# Patient Record
Sex: MALE
[De-identification: names, ages, dates, MRNs, and addresses within clinical notes are randomized per-mention and may not be internally consistent; named-entity substitution may affect disease eponyms.]

---

## 2017-09-12 NOTE — EDM.PDOC
ED HPI GENERAL MEDICAL PROBLEM





- General


Chief Complaint: Laceration


Stated Complaint: CUT ON RIGHT HAND


Time Seen by Provider: 09/12/17 20:00


Source of Information: Reports: Patient


History Limitations: Reports: No Limitations





- History of Present Illness


INITIAL COMMENTS - FREE TEXT/NARRATIVE: 


HISTORY AND PHYSICAL:





History of present illness:


Patient is a 62-year-old male who presents to the emergency room with a 

laceration to the right hand. Patient was in his carotids working with a 

circular saw and received a 4 cm laceration to the web between the first and 

second digit. Patient has full range of motion without any difficulty


Patient is unsure of his last tetanus shot, we will provide that today.





Review of systems: 


As per history of present illness and below otherwise all systems reviewed and 

negative.





Past medical history: 


As per history of present illness and as reviewed below otherwise 

noncontributory.





Surgical history: 


As per history of present illness and as reviewed below otherwise 

noncontributory.





Social history: 


No reported history of drug or alcohol abuse.





Family history: 


As per history of present illness and as reviewed below otherwise 

noncontributory.





Physical exam:


HEENT: Atraumatic, normocephalic, pupils reactive, negative for conjunctival 

pallor or scleral icterus, mucous membranes moist, throat clear, neck supple, 

nontender, trachea midline.


Lungs: Clear to auscultation, breath sounds equal bilaterally, chest nontender.


Heart: S1S2, regular, negative for clicks, rubs, or JVD.


Abdomen: Soft, nondistended, nontender. Negative for masses or 

hepatosplenomegaly. Negative for costovertebral tenderness.


Pelvis: Stable nontender.


Genitourinary: Deferred.


Rectal: Deferred.


Skin: 4 cm laceration to the web between the first and second digit on the 

right hand that reaches half way across the proximal knuckle. No tendon 

involvement. No current bleeding noted.


Extremities: Atraumatic, negative for cords or calf pain. Neurovascular 

unremarkable.


Neuro: Awake, alert, oriented. Cranial nerves II through XII unremarkable. 

Cerebellum unremarkable. Motor and sensory unremarkable throughout. Exam 

nonfocal.





Diagnostics:


X-ray of right hand





Therapeutics:


Lidocaine, bacitracin, nonstick dressing, Aluminum spoon splint


Laceration was explored to the base in a bloodless field him a no tendon 

involvement. 6 interrupted sutures were completed using 4-0 nylon.





Impression: 


Laceration





Definitive disposition and diagnosis as appropriate pending reevaluation and 

review of above.


Onset: Today


Duration: Minutes:





- Related Data


 Allergies











Allergy/AdvReac Type Severity Reaction Status Date / Time


 


No Known Allergies Allergy   Verified 09/12/17 20:47











Home Meds: 


 Home Meds





Levothyroxine Sodium [Synthroid] 200 mcg PO ACBRK 04/23/14 [History]


Lisinopril [Zestril] 10 mg PO DAILY 04/23/14 [History]


Enoxaparin [Lovenox] 140 mg SUBCUT 0600,1800 #8 syringe 04/25/15 [Rx]


Warfarin [Coumadin] 7.5 mg PO DAILY@1400 #6 tablet 04/25/15 [Rx]











Past Medical History





- Past Health History


Medical/Surgical History: Denies Medical/Surgical History





Social & Family History





- Tobacco Use


Smoking Status *Q: Former Smoker


Years of Tobacco use: 10


Used Tobacco, but Quit: Yes


Month Tobacco Last Used: 30 years ago


Second Hand Smoke Exposure: Yes





- Alcohol Use


Days Per Week of Alcohol Use: 1


Number of Drinks Per Day: 1


Total Drinks Per Week: 1





- Recreational Drug Use


Recreational Drug Use: No





ED ROS GENERAL





- Review of Systems


Review Of Systems: ROS reveals no pertinent complaints other than HPI.





ED EXAM, SKIN/RASH


Exam: See Below (See dictation)





ED SKIN PROCEDURES





- Laceration/Wound Repair


  ** Right Hand


Appearance: Subcutaneous


Anesthetic Type: Local


Local Anesthesia - Lidocaine (Xylocaine): 1% Plain


Local Anesthetic Volume: Other (8)


Skin Prep: Chlorhexidine (Hibiciens), Saline, Sterile Drape


Exploration/Debridement/Repair: Wound Explored, In a Bloodless Field, No 

Foreign Material Found


Closed with: Sutures


Suture Size: 4-0


# of Sutures: 6


Suture Type: Nylon





Course





- Vital Signs


Last Recorded V/S: 


 Last Vital Signs











Temp  36.4 C   09/12/17 20:00


 


Pulse  75   09/12/17 20:00


 


Resp  16   09/12/17 20:00


 


BP  130/71   09/12/17 20:00


 


Pulse Ox  99   09/12/17 20:00














- Orders/Labs/Meds


Orders: 


 Active Orders 24 hr











 Category Date Time Status


 


 Communication Order [RC] STAT Care  09/12/17 19:59 Active


 


 Communication Order [RC] STAT Care  09/12/17 20:45 Ordered


 


 Vaccines to be Administered [RC] PER UNIT ROUTINE Care  09/12/17 19:59 Active


 


 Hand 2V Rt [CR] Stat Exams  09/12/17 20:02 Taken











Meds: 


Medications














Discontinued Medications














Generic Name Dose Route Start Last Admin





  Trade Name Mikhail  PRN Reason Stop Dose Admin


 


Bacitracin  1 dose  09/12/17 19:59  09/12/17 20:08





  Bacitracin Oint 1 Gm  TOP  09/12/17 20:00  1 dose





  ONETIME ONE   Administration


 


Diphtheria/Tetanus/Acell Pertussis  0.5 ml  09/12/17 19:58  09/12/17 20:08





  Adacel  IM  09/12/17 19:59  0.5 ml





  .ONCE ONE   Administration


 


Lidocaine HCl  20 ml  09/12/17 19:59  09/12/17 20:07





  Xylocaine 1%  INJECT  09/12/17 20:00  20 ml





  ONETIME ONE   Administration














Departure





- Departure


Time of Disposition: 20:49


Disposition: Home, Self-Care 01


Condition: Good


Clinical Impression: 


 Laceration








- Discharge Information


Referrals: 


Raymond Garduno MD [Primary Care Provider] - 


Forms:  ED Department Discharge


Additional Instructions: 


The following information is given to patients seen in the emergency department 

who are being discharged to home. This information is to outline your options 

for follow-up care. We provide all patients seen in our emergency department 

with a follow-up referral.





The need for follow-up, as well as the timing and circumstances, are variable 

depending upon the specifics of your emergency department visit.





If you don't have a primary care physician on staff, we will provide you with a 

referral. We always advise you to contact your personal physician following an 

emergency department visit to inform them of the circumstance of the visit and 

for follow-up with them and/or the need for any referrals to a consulting 

specialist.





The emergency department will also refer you to a specialist when appropriate. 

This referral assures that you have the opportunity for followup care with a 

specialist. All of these measure are taken in an effort to provide you with 

optimal care, which includes your followup.





Under all circumstances we always encourage you to contact your private 

physician who remains a resource for coordinating  your care. When calling for 

followup care, please make the office aware that this follow-up is from your 

recent emergency room visit. If for any reason you are refused follow-up, 

please contact the [facility] emergency department at [phone] 

and asked to speak to the emergency department charge nurse.





[facility]


Primary Care


[address] [place] [id number]


Phone: [phone]


Fax: [phone]





1. Please keep the wound clean and dry as we discussed. The aluminum splint we 

have provided you will prevent the sutures from popping open as we discussed. 

Monitor for signs of infection as we discussed.


2. Sutures are to be removed in 7-10 days. Follow up with her primary care in 

the next 1-2 days. Follow-up in the ED as needed discussed





- My Orders


Last 24 Hours: 


My Active Orders





09/12/17 19:59


Communication Order [RC] STAT 


Vaccines to be Administered [RC] PER UNIT ROUTINE 





09/12/17 20:02


Hand 2V Rt [CR] Stat 





09/12/17 20:45


Communication Order [RC] STAT 














- Assessment/Plan


Last 24 Hours: 


My Active Orders





09/12/17 19:59


Communication Order [RC] STAT 


Vaccines to be Administered [RC] PER UNIT ROUTINE 





09/12/17 20:02


Hand 2V Rt [CR] Stat 





09/12/17 20:45


Communication Order [RC] STAT

## 2017-09-13 NOTE — CR
EXAM DATE: 17



PATIENT'S AGE: 62





Patient: DELFINO TREADWELL



Facility: [place], [place]

Patient ID: [id number]

Site Patient ID: [id number].

Site Accession #: [id number]

: 1955

Study: XRay Extremity Right hand [id number]/12/2017 8:24:35 PM

Ordering Physician: Doctor Temp



Final Report: 

INDICATION:

Pain, laceration 



TECHNIQUE:

Three views right hand 



COMPARISON:

None



FINDINGS:

Bones: Alignment is normal. No fractures or bone lesions. 

Joint spaces: Mild degenerative changes PIP and DIP joint spaces. 

Soft tissues: Unremarkable. 



IMPRESSION:

No evidence of acute trauma. 

Mild degenerative changes PIP and DIP joint spaces



Dictated by Raymond Grady MD @ 2017 8:47:27 PM





Dictated by: Raymond Grady MD @ 2017 20:47:33

(Electronic Signature)



Report Signed by Proxy.
[facility]

## 2021-04-13 ENCOUNTER — HOSPITAL ENCOUNTER (OUTPATIENT)
Dept: HOSPITAL 56 - MW.SDS | Age: 66
Discharge: HOME | End: 2021-04-13
Attending: SURGERY
Payer: COMMERCIAL

## 2021-04-13 VITALS — DIASTOLIC BLOOD PRESSURE: 75 MMHG | HEART RATE: 86 BPM | SYSTOLIC BLOOD PRESSURE: 131 MMHG

## 2021-04-13 DIAGNOSIS — F17.210: ICD-10-CM

## 2021-04-13 DIAGNOSIS — I10: ICD-10-CM

## 2021-04-13 DIAGNOSIS — K64.8: ICD-10-CM

## 2021-04-13 DIAGNOSIS — E03.9: ICD-10-CM

## 2021-04-13 DIAGNOSIS — E55.9: ICD-10-CM

## 2021-04-13 DIAGNOSIS — K57.30: ICD-10-CM

## 2021-04-13 DIAGNOSIS — Z79.899: ICD-10-CM

## 2021-04-13 DIAGNOSIS — Z79.890: ICD-10-CM

## 2021-04-13 DIAGNOSIS — Z98.890: ICD-10-CM

## 2021-04-13 DIAGNOSIS — E66.9: ICD-10-CM

## 2021-04-13 DIAGNOSIS — K64.4: ICD-10-CM

## 2021-04-13 DIAGNOSIS — D12.3: Primary | ICD-10-CM

## 2021-04-13 PROCEDURE — 88304 TISSUE EXAM BY PATHOLOGIST: CPT

## 2021-04-13 PROCEDURE — 46999 UNLISTED PROCEDURE ANUS: CPT

## 2021-04-13 PROCEDURE — 88305 TISSUE EXAM BY PATHOLOGIST: CPT

## 2021-04-13 PROCEDURE — 45380 COLONOSCOPY AND BIOPSY: CPT

## 2021-04-13 NOTE — PCM.POSTAN
POST ANESTHESIA ASSESSMENT





- MENTAL STATUS


Mental Status: Alert (no anesthetic problems), Oriented





- VITAL SIGNS


Vital Signs: 


                                Last Vital Signs











Temp  97.2 F   04/13/21 12:37


 


Pulse  100   04/13/21 12:56


 


Resp  15   04/13/21 12:56


 


BP  100/51 L  04/13/21 12:56


 


Pulse Ox  93 L  04/13/21 12:56














- RESPIRATORY


Respiratory Status: Respiratory Rate WNL, Airway Patent, O2 Saturation Stable





- CARDIOVASCULAR


CV Status: Pulse Rate WNL, Blood Pressure Stable





- GASTROINTESTINAL


GI Status: No Symptoms





- POST OP HYDRATION


Hydration Status: Adequate & Stable

## 2021-04-13 NOTE — PCM48HPAN
Post Anesthesia Note





- EVALUATION WITHIN 48HRS OF ANESTHETIC


Vital Signs in Normal Range: Yes


Patient Participated in Evaluation: Yes


Respiratory Function Stable: Yes


Airway Patent: Yes


Cardiovascular Function Stable: Yes


Hydration Status Stable: Yes


Pain Control Satisfactory: Yes


Nausea and Vomiting Control Satisfactory: Yes


Mental Status Recovered: Yes


Vital Signs: 


                                Last Vital Signs











Temp  97.2 F   04/13/21 12:37


 


Pulse  100   04/13/21 12:56


 


Resp  15   04/13/21 12:56


 


BP  100/51 L  04/13/21 12:56


 


Pulse Ox  93 L  04/13/21 12:56

## 2021-04-13 NOTE — PCM.PREANE
Preanesthetic Assessment





- Anesthesia/Transfusion/Family Hx


Anesthesia History: Prior Anesthesia Without Reaction


Family History of Anesthesia Reaction: No


Transfusion History: No Prior Transfusion(s)





- Review of Systems


General: No Symptoms


Pulmonary: No Symptoms


Cardiovascular: No Symptoms


Gastrointestinal: No Symptoms


Neurological: No Symptoms


Other: Reports: None





- Physical Assessment


NPO Status Date: 04/13/21


NPO Status Time: 00:05


Vital Signs: 





                                Last Vital Signs











Temp  97.3 F   04/13/21 10:17


 


Pulse  90   04/13/21 10:17


 


Resp  16   04/13/21 10:17


 


BP  140/81   04/13/21 10:17


 


Pulse Ox  94 L  04/13/21 10:17











Height: 6 ft


Weight: 287 lb


ASA Class: 2


Mental Status: Alert & Oriented x3


Airway Class: Mallampati = 3


Dentition: Reports: Normal Dentition, Partial


ROM/Head Extension: Limited/Partial


Lungs: Clear to Auscultation, Normal Respiratory Effort


Cardiovascular: Regular Rate, Regular Rhythm





- Allergies


Allergies/Adverse Reactions: 


                                    Allergies











Allergy/AdvReac Type Severity Reaction Status Date / Time


 


No Known Allergies Allergy   Verified 04/13/21 10:14














- Anesthesia Plan


Pre-Op Medication Ordered: None





- Acknowledgements


Anesthesia Type Planned: General Anesthesia


Pt an Appropriate Candidate for the Planned Anesthesia: Yes


Alternatives and Risks of Anesthesia Discussed w Pt/Guardian: Yes


Pt/Guardian Understands and Agrees with Anesthesia Plan: Yes


Additional Comments: 





npo aftr mn


asthma prn inhalers


htn no cv problems


tob  quit 1990


chew tob now


etoh daily 1-2 beers, occ whisky


obesity   bmi 37


had pulmonary embolus abt 7 years ago treated with coumadin.  He stopped this 

med several years ago


mal 3 aw, partial lower denture


par no questions








PreAnesthesia Questionnaire





- Past Health History


Medical/Surgical History: Denies Medical/Surgical History


HEENT History: Reports: Other (See Below)


Other HEENT History: wears glasses, bottom partial


Cardiovascular History: Reports: Hypertension


Respiratory History: Reports: Asthma, PE


Other Respiratory History: PT in the past


Gastrointestinal History: Reports: Hemorrhoids


Genitourinary History: Reports: None


Musculoskeletal History: Reports: None


Neurological History: Reports: None


Psychiatric History: Reports: None


Endocrine/Metabolic History: Reports: Hypothyroidism, Obesity/BMI 30+


Hematologic History: Reports: None


Immunologic History: Reports: None


Oncologic (Cancer) History: Reports: None


Dermatologic History: Reports: Eczema





- Infectious Disease History


Infectious Disease History: Reports: Chicken Pox, Measles





- Past Surgical History


Head Surgeries/Procedures: Reports: None


HEENT Surgical History: Reports: None


Cardiovascular Surgical History: Reports: None


Respiratory Surgical History: Reports: None


GI Surgical History: Reports: None


Male  Surgical History: Reports: None


Endocrine Surgical History: Reports: None


Neurological Surgical History: Reports: None


Musculoskeletal Surgical History: Reports: None


Oncologic Surgical History: Reports: None


Dermatological Surgical History: Reports: None





- SUBSTANCE USE


Tobacco Use Status *Q: Former Tobacco User


Tobacco Use Within Last Twelve Months: Other (See Below)


Days Per Week of Alcohol Use: 7


Number of Drinks Per Day: 2


Total Drinks Per Week: 14


Recreational Drug Use History: No





- HOME MEDS


Home Medications: 


                                    Home Meds





Levothyroxine Sodium [Synthroid] 200 mcg PO ACBRK 04/23/14 [History]


Albuterol Sulfate [Albuterol Sulfate HFA] 2 puff INH ASDIRECTED PRN 04/07/21 

[History]


Ergocalciferol (Vitamin D2) [Vitamin D2] 50,000 units PO ASDIRECTED 04/07/21 

[History]


Hydrocortisone [Hydrocortisone 2.5% Crm] 1 applic TOP ASDIRECTED PRN 04/07/21 

[History]


Lisinopril/Hydrochlorothiazide [Lisinopril-HCTZ 10-12.5 MG] 1 tab PO DAILY 

04/07/21 [History]











- CURRENT (IN HOUSE) MEDS


Current Meds: 





                               Current Medications





Lactated Ringer's (Ringers, Lactated)  1,000 mls @ 125 mls/hr IV ASDIRECTED [facility]


   Last Admin: 04/13/21 10:13 Dose:  125 mls/hr


   Documented by: 


Sodium Chloride (Sodium Chloride 0.9% 10 Ml Syringe)  10 ml FLUSH ASDIRECTED PRN


   PRN Reason: Keep Vein Open


Sodium Chloride (Sodium Chloride 0.9% 2.5 Ml Syringe)  2.5 ml FLUSH ASDIRECTED 

PRN


   PRN Reason: Keep Vein Open


Sodium Chloride (Sodium Chloride 0.9% 10 Ml Syringe)  10 ml FLUSH ASDIRECTED PRN


   PRN Reason: Keep Vein Open


Sodium Chloride (Sodium Chloride 0.9% 2.5 Ml Syringe)  2.5 ml FLUSH ASDIRECTED 

PRN


   PRN Reason: Keep Vein Open


Sodium Chloride (Sodium Chloride 0.9% 10 Ml Sdv)  10 ml IV ASDIRECTED PRN


   PRN Reason: IV Use





Discontinued Medications





Cefoxitin Sodium 2 gm/ Premix  50 mls @ 100 mls/hr IV ONETIME ONE


   Stop: 04/12/21 09:00

## 2021-04-13 NOTE — PCM.OPNOTE
- General Post-Op/Procedure Note


Date of Surgery/Procedure: 04/13/21


Operative Procedure(s): Screening colonoscopy with polypectomy, single column 

hemorrhoidectomy


Findings: 





Large pedunculated right posterior hemorrhoid, transverse colon polyp, 

diverticulosis throughout the colon 


Pre Op Diagnosis: Hemorrhoid, Screening colonoscopy


Post-Op Diagnosis: Hemorrhoid, transverse colon polyp, diverticulosis


Anesthesia Technique: MAC


Primary Surgeon: Radha Celaya


Fluid Replacement, Intraop: 700


EBL in mLs: 5


Condition: Good

## 2021-04-14 NOTE — OR
SURGEON:

RADHA CELAYA MD

 

DATE OF PROCEDURE:  04/13/2021

 

PREOPERATIVE DIAGNOSES:

1. Screening colonoscopy.

2. Hemorrhoidectomy.

 

PROCEDURE PERFORMED:

1. Screening colonoscopy with polypectomy.

2. Hemorrhoidectomy.

 

PRIMARY SURGEON:

Radha Celaya MD

 

ANESTHESIA:

MAC, local.

 

FLUIDS:

See Anesthesia record.

 

ESTIMATED BLOOD LOSS:

3 mL.

 

FINDINGS:

Transverse colon polyp, diverticulosis throughout the colon, and a large right

posterior external hemorrhoid.

 

PREPARATION:

Good.

 

LIMITATIONS:

None.

 

COMPLICATIONS:

None.

 

INDICATIONS:

The patient is a 66-year-old male who came to see me for a screening

colonoscopy.  He also was complaining of a large external hemorrhoid, which is

causing him intermittent discomfort and making it difficult to keep himself

clean.  Physical exam revealed a large pedunculated external hemorrhoid that

would be amenable to resection.  I explained the colonoscopy procedure as well

as the hemorrhoidectomy procedure.  We went through the expected perioperative

course and the risks.  He verbalized understanding and wishes to proceed.

 

PROCEDURE IN DETAIL:

The patient was brought into the endoscopy suite and placed in the left lateral

decubitus position.  A time-out was completed verifying the patient's name, age,

date of birth, allergies, and procedure to be performed.  Monitored anesthesia

care was induced and continuous oxygen was provided via nasal cannula throughout

the procedure.  After adequate sedation was achieved, a digital rectal exam was

performed.  Again, I noted a right posterior external hemorrhoid, which was

quite large.  The remainder of the digital rectal exam was normal.  A well-

lubricated colonoscope was then inserted into the rectum and advanced under

direct visualization to the level of the cecum.  The patient had a very

redundant colon making it difficult for me to get to the cecum.  I was able to

look within the cecal cap but not intubate it.  After multiple position changes

and other attempts, a photograph of this was taken, and I made no further

attempts to physically enter the cecum or retroflex the scope within that area.

The scope was fully withdrawn, and I closely inspected the mucosa throughout the

colon.  The patient had diverticulosis even within the ascending colon.  In the

mid transverse colon, the patient was noted to have a small sessile polyp.  This

was removed in piecemeal fashion using cold biopsy forceps.  The remainder of

the exam was normal, and the scope was retroflexed within the rectum to allow

visualization of the anal canal opening.  The internal hemorrhoids appeared

normal.  A photograph was taken.  The scope was straightened out and fully

withdrawn.  The cecum to anus time was 16 minutes.  This portion the procedure

was ended.  I then began preparation for the external hemorrhoidectomy.  The

buttocks and anoderm were prepped and draped in usual standard fashion.  I

anesthetized the skin around and underneath the external hemorrhoid with 0.5%

Marcaine plain.  A Catia clamp was used to grasp the hemorrhoid.  Using needle-

tip cautery, I then excised the hemorrhoid in a noni-shaped fashion over its

base.  The external hemorrhoid was excised and sent to Pathology.  The mucosal

and anoderm defect were then closed using a 2-0 chromic suture.  On the mucosa,

I used a running locking stitch.  On the anoderm, I used a simple stitch.  I

then inspected my operative field for hemostasis.  The area appeared hemostatic.

I then circumferentially anesthetized the anoderm with the remaining 0.5%

Marcaine plain.  Fluffs and mesh underwear were then applied.  The patient was

placed in supine position and awoken.  He was taken to PACU in stable condition.

All counts were complete and correct at the end of the case.

 

ENDOSCOPIC DIAGNOSES:

Transverse colon polyp, diverticulosis throughout the colon, and a large right

posterior external hemorrhoid.

 

RECOMMENDATIONS:

Follow up in clinic in 2 weeks.

 

 

JACE MC

DD:  04/14/2021 13:22:40

DT:  04/14/2021 17:19:07

Job #:  [id number]

[email or web address]

## 2021-10-20 ENCOUNTER — HOSPITAL ENCOUNTER (EMERGENCY)
Dept: HOSPITAL 56 - MW.ED | Age: 66
Discharge: HOME | End: 2021-10-20
Payer: COMMERCIAL

## 2021-10-20 VITALS — SYSTOLIC BLOOD PRESSURE: 132 MMHG | DIASTOLIC BLOOD PRESSURE: 90 MMHG | HEART RATE: 80 BPM

## 2021-10-20 DIAGNOSIS — I48.92: Primary | ICD-10-CM

## 2021-10-20 DIAGNOSIS — J45.909: ICD-10-CM

## 2021-10-20 DIAGNOSIS — I10: ICD-10-CM

## 2021-10-20 DIAGNOSIS — E03.9: ICD-10-CM

## 2021-10-20 DIAGNOSIS — E66.9: ICD-10-CM

## 2021-10-20 DIAGNOSIS — Z79.899: ICD-10-CM

## 2021-10-20 LAB
BUN SERPL-MCNC: 15 MG/DL (ref 7–18)
CHLORIDE SERPL-SCNC: 99 MMOL/L (ref 98–107)
CO2 SERPL-SCNC: 29.8 MMOL/L (ref 21–32)
GLUCOSE SERPL-MCNC: 118 MG/DL (ref 74–106)
POTASSIUM SERPL-SCNC: 5.1 MMOL/L (ref 3.5–5.1)
SODIUM SERPL-SCNC: 138 MMOL/L (ref 136–148)

## 2021-10-20 PROCEDURE — 85379 FIBRIN DEGRADATION QUANT: CPT

## 2021-10-20 PROCEDURE — 80053 COMPREHEN METABOLIC PANEL: CPT

## 2021-10-20 PROCEDURE — 71275 CT ANGIOGRAPHY CHEST: CPT

## 2021-10-20 PROCEDURE — 36415 COLL VENOUS BLD VENIPUNCTURE: CPT

## 2021-10-20 PROCEDURE — 71045 X-RAY EXAM CHEST 1 VIEW: CPT

## 2021-10-20 PROCEDURE — 99285 EMERGENCY DEPT VISIT HI MDM: CPT

## 2021-10-20 PROCEDURE — 84484 ASSAY OF TROPONIN QUANT: CPT

## 2021-10-20 PROCEDURE — 85025 COMPLETE CBC W/AUTO DIFF WBC: CPT

## 2021-10-20 NOTE — CR
INDICATION:



Chest pain.



TECHNIQUE:



Chest 1 view.



COMPARISON:



Chest radiograph 05/14/2021.



FINDINGS:



Small right pleural effusion and right basilar atelectasis. No 

pneumothorax. Stable mild cardiomegaly. Normal pulmonary vascularity. The 

bones are unremarkable.



IMPRESSION:



1. Small right pleural effusion and right basilar atelectasis. 



2. Stable mild cardiomegaly.



Dictated by Katherine Mosher MD @ 10/20/2021 2:39:36 PM



(Electronically Signed)

## 2021-10-20 NOTE — CT
INDICATION:



Dyspnea. Positive D-dimer.



COMPARISON:



Plain film same date and CT 14 May 2021.



TECHNIQUE:



100 mL Isovue-370 IV contrast.



FINDINGS:



No pulmonary embolism was acute. Soft tissue at the periphery of the right 

main pulmonary artery bifurcation into the lower lobe artery looks similar 

to before. Moderately large dependent right pleural effusions stable from 

comparison.



Pack ascites under the right hemidiaphragm. No pathologic mediastinal or 

hilar adenopathy. No right heart strain finding. Lung parenchyma is clear. 

Mild interstitial peribronchial thickening in the lower lobes greater than 

the upper.



IMPRESSION:



No evidence for acute PE. Some evidence for possible pulmonary artery wall 

thickening at the bifurcation of right main pulmonary artery could be 

sequela of chronic PE. Stable moderate-sized dependent right pleural 

effusion. Stable perihepatic ascites.



Please note that all CT scans at this facility use dose modulation, 

iterative reconstruction, and/or weight-based dosing when appropriate to 

reduce radiation dose to as low as reasonably achievable.



Dictated by Dashawn Abbott MD @ 10/20/2021 5:41:02 PM



(Electronically Signed)

## 2021-10-20 NOTE — EDM.PDOC
<Yury Katz - Last Filed: 10/20/21 18:01>





ED HPI GENERAL MEDICAL PROBLEM





- General


Chief Complaint: Chest Pain


Stated Complaint: CHEST PAIN, SOB


Time Seen by Provider: 10/20/21 14:19





- History of Present Illness


INITIAL COMMENTS - FREE TEXT/NARRATIVE: 





History of present illness:


[]


The patient noticed when he woke up about 6 hours ago that he had pain in his 

left chest.  There was a dull pain and then it was an intermittent sharp pain it

 was very very brief.  It is associated with some mild diaphoresis no nausea but

 significant shortness of breath.  He has been short of breath for years but he 

is a little more short of breath today.  The patient smoked a total of less than

 10 years and stopped more than 30 years ago.  The patient has had a history of 

a pulmonary embolus 8 years ago and is a longtime off blood thinners.  The 

patient has been told that because of his shortness of breath he needs to have a

 stress test but he has not had it.








Review of systems: 


As per history of present illness and below otherwise all systems reviewed and 

negative.





Past medical history: 


As per history of present illness and as reviewed below otherwise 

noncontributory.





Surgical history: 


As per history of present illness and as reviewed below otherwise 

noncontributory.





Social history: 


No reported history of drug or alcohol abuse.





Family history: 


As per history of present illness and as reviewed below otherwise 

noncontributory.





Physical exam:


Constitutional -patient is obese-please see height and weight regarding his BMI-

well developed, well-nourished and in no acute distress


HEENT - normocephalic, no evidence of trauma - external nose and mouth normal - 

no mass in neck and no JVD - mucosae moist





EYES - full EOM, PERRL, no icterus - no evidence of inflammation, injection, or 

drainage





Respiratory - no respiratory distress, equal bilateral expansion, lungs clear to

 auscultation and no abnormal lung sounds





Cardiovascular - Regular Rhythm with S1 and S2 appreciated and no murmur, gallop

 or rub.





GI - abdomen soft without distension or organomegaly - normal bowel sounds - no 

guard or rebound





Musculoskeletal  no gross deformity of long bones or joints - no tenderness, 

swelling or edema





Neurologic - Alert and oriented times four - CN II-XII grossly intact - motor 

sensory and coordination symmetrically normal





Psychiatric - appropriate mood and affect with normal thought content





Hematologic - No petechiae or purpura - mucosa appropriate color and sclera not 

pale - normal nail bed color and refill





Integument - no rash or evidence of trauma - normal turgor





Diagnostics:


[]





Therapeutics:


[]





Impression: 


[]





Plan:


[]





Definitive disposition and diagnosis as appropriate pending reevaluation and 

review of above.








  ** chest


Pain Score (Numeric/FACES): 2





- Related Data


                                    Allergies











Allergy/AdvReac Type Severity Reaction Status Date / Time


 


No Known Allergies Allergy   Verified 04/13/21 10:14











Home Meds: 


                                    Home Meds





Levothyroxine Sodium [Synthroid] 200 mcg PO ACBRK 04/23/14 [History]


Albuterol Sulfate [Albuterol Sulfate HFA] 2 puff INH ASDIRECTED PRN 04/07/21 

[History]











Past Medical History





- Past Health History


Medical/Surgical History: Denies Medical/Surgical History


HEENT History: Reports: Other (See Below)


Other HEENT History: wears glasses, bottom partial


Cardiovascular History: Reports: Hypertension


Respiratory History: Reports: Asthma, PE


Other Respiratory History: PT in the past


Gastrointestinal History: Reports: Hemorrhoids


Genitourinary History: Reports: None


Musculoskeletal History: Reports: None


Neurological History: Reports: None


Psychiatric History: Reports: None


Endocrine/Metabolic History: Reports: Hypothyroidism, Obesity/BMI 30+


Hematologic History: Reports: None


Immunologic History: Reports: None


Oncologic (Cancer) History: Reports: None


Dermatologic History: Reports: Eczema





- Infectious Disease History


Infectious Disease History: Reports: Chicken Pox, Measles





- Past Surgical History


Head Surgeries/Procedures: Reports: None


HEENT Surgical History: Reports: None


Cardiovascular Surgical History: Reports: None


Respiratory Surgical History: Reports: None


GI Surgical History: Reports: None


Male  Surgical History: Reports: None


Endocrine Surgical History: Reports: None


Neurological Surgical History: Reports: None


Musculoskeletal Surgical History: Reports: None


Oncologic Surgical History: Reports: None


Dermatological Surgical History: Reports: None





ED ROS GENERAL





- Review of Systems


Review Of Systems: Comprehensive ROS is negative, except as noted in HPI.





ED EXAM, GENERAL





- Physical Exam


Exam: See Below


Free Text/Narrative:: 





My physical exam is in the HPI


  ** #1 Interpretation


EKG Interpretation Comments: 





EKG performed 10/20/2021 at 2 PM shows atrial flutter with heart rate 86 QRS 

duration 136 QT duration 41 axis 75 right bundle branch pattern on the QRS.  

Compared to 4/23/2015 conduction is changed and the rhythm is changed from 

sinus.  Impression new atrial flutter.





Departure





- Departure


Disposition: Home, Self-Care 01


Condition: Good


Clinical Impression: 


Dyspnea


Qualifiers:


 Dyspnea type: unspecified Qualified Code(s): R06.00 - Dyspnea, unspecified





Atrial flutter


Qualifiers:


 Atrial flutter type: unspecified Qualified Code(s): I48.92 - Unspecified atrial

 flutter








- Discharge Information


Instructions:  Shortness of Breath, Adult, Easy-to-Read, Atrial Flutter


Referrals: 


Kathy Gomes PA [Primary Care Provider] - 


Forms:  ED Department Discharge


Additional Instructions: 


You have a condition called atrial flutter.  This is an irregular heartbeat but 

your rate is controlled.  The appropriate follow-up is to see a cardiologist as 

soon as possible and have them go proceed with the stress test you had 

considered in the past and decide if you need anticoagulation or medication or 

cardioversion.





[facility] - cardiology


[address] [place] [address]


Phone: [phone]


Fax: [phone]








The following information is given to patients seen in the emergency department 

who are being discharged to home. This information is to outline your options 

for follow-up care. We provide all patients seen in our emergency department 

with a follow-up referral.





The need for follow-up, as well as the timing and circumstances, are variable 

depending upon the specifics of your emergency department visit.





If you don't have a primary care physician on staff, we will provide you with a 

referral. We always advise you to contact your personal physician following an 

emergency department visit to inform them of the circumstance of the visit and 

for follow-up with them and/or the need for any referrals to a consulting 

specialist.





The emergency department will also refer you to a specialist when appropriate. 

This referral assures that you have the opportunity for follow-up care with a 

specialist. All of these measure are taken in an effort to provide you with 

optimal care, which includes your follow-up.





Under all circumstances we always encourage you to contact your private 

physician who remains a resource for coordinating your care. When calling for 

follow-up care, please make the office aware that this follow-up is from your 

recent emergency room visit. If for any reason you are refused follow-up, please

contact the [facility] Emergency Department

at [phone] and asked to speak to the emergency department charge nurse.








[facility] - Primary Care


[address] [place] [address]


Phone: [phone]


Fax: [phone]








[facility]


[address] [place] [address]


Phone: [phone]


Fax: [phone]








<Peter Og - Last Filed: 10/20/21 18:21>





Course





- Vital Signs


Last Recorded V/S: 





                                Last Vital Signs











Temp  98.4 F   10/20/21 14:10


 


Pulse  83   10/20/21 15:37


 


Resp  20   10/20/21 15:37


 


BP  135/87   10/20/21 15:37


 


Pulse Ox  94 L  10/20/21 15:37














- Orders/Labs/Meds


Orders: 





                               Active Orders 24 hr











 Category Date Time Status


 


 Cardiac Monitoring [RC] .AS DIRECTED Care  10/20/21 14:04 Active


 


 Sodium Chloride 0.9% [Saline Flush] Med  10/20/21 14:04 Active





 10 ml FLUSH ASDIRECTED PRN   


 


 Sodium Chloride 0.9% [Saline Flush] Med  10/20/21 14:04 Active





 2.5 ml FLUSH ASDIRECTED PRN   


 


 Saline Lock Insert [OM.PC] Stat Oth  10/20/21 14:04 Ordered








                                Medication Orders





Sodium Chloride (Sodium Chloride 0.9% 10 Ml Syringe)  10 ml FLUSH ASDIRECTED PRN


   PRN Reason: Keep Vein Open


   Last Admin: 10/20/21 14:40  Dose: 10 ml


   Documented by: CHEVY


Sodium Chloride (Sodium Chloride 0.9% 2.5 Ml Syringe)  2.5 ml FLUSH ASDIRECTED 

PRN


   PRN Reason: Keep Vein Open


   Last Admin: 10/20/21 14:40  Dose: 2.5 ml


   Documented by: CHEVY








Labs: 





                                Laboratory Tests











  10/20/21 10/20/21 10/20/21 Range/Units





  14:22 14:22 14:22 


 


WBC  10.94    (4.0-11.0)  K/uL


 


RBC  4.82    (4.50-5.90)  M/uL


 


Hgb  15.8    (13.0-17.0)  g/dL


 


Hct  46.8    (38.0-50.0)  %


 


MCV  97.1    (80.0-98.0)  fL


 


MCH  32.8 H    (27.0-32.0)  pg


 


MCHC  33.8    (31.0-37.0)  g/dL


 


RDW Std Deviation  52.3    (28.0-62.0)  fl


 


RDW Coeff of Kevin  15    (11.0-15.0)  %


 


Plt Count  249    (150-400)  K/uL


 


MPV  9.30    (7.40-12.00)  fL


 


Neut % (Auto)  81.8 H    (48.0-80.0)  %


 


Lymph % (Auto)  9.3 L    (16.0-40.0)  %


 


Mono % (Auto)  8.1    (0.0-15.0)  %


 


Eos % (Auto)  0.5    (0.0-7.0)  %


 


Baso % (Auto)  0.3    (0.0-1.5)  %


 


Neut # (Auto)  9.0 H    (1.4-5.7)  K/uL


 


Lymph # (Auto)  1.0    (0.6-2.4)  K/uL


 


Mono # (Auto)  0.9 H    (0.0-0.8)  K/uL


 


Eos # (Auto)  0.1    (0.0-0.7)  K/uL


 


Baso # (Auto)  0.0    (0.0-0.1)  K/uL


 


Nucleated RBC %  0.0    /100WBC


 


Nucleated RBCs #  0    K/uL


 


D-Dimer, Quantitative   1.78 H   (0.0-0.50)  mg/L FEU


 


Sodium    138  (136-148)  mmol/L


 


Potassium    5.1  (3.5-5.1)  mmol/L


 


Chloride    99  ()  mmol/L


 


Carbon Dioxide    29.8  (21.0-32.0)  mmol/L


 


BUN    15  (7.0-18.0)  mg/dL


 


Creatinine    0.9  (0.8-1.3)  mg/dL


 


Est Cr Clr Drug Dosing    86.22  mL/min


 


Estimated GFR (MDRD)    > 60.0  ml/min


 


Glucose    118 H  ()  mg/dL


 


Calcium    9.2  (8.5-10.1)  mg/dL


 


Total Bilirubin    1.9 H  (0.2-1.0)  mg/dL


 


AST    35  (15-37)  IU/L


 


ALT    34  (14-63)  IU/L


 


Alkaline Phosphatase    118 H  ()  U/L


 


Troponin I    < 0.050  (0.000-0.056)  ng/mL


 


Total Protein    7.9  (6.4-8.2)  g/dL


 


Albumin    3.6  (3.4-5.0)  g/dL


 


Globulin    4.3 H  (2.6-4.0)  g/dL


 


Albumin/Globulin Ratio    0.8 L  (0.9-1.6)  














  10/20/21 Range/Units





  17:45 


 


WBC   (4.0-11.0)  K/uL


 


RBC   (4.50-5.90)  M/uL


 


Hgb   (13.0-17.0)  g/dL


 


Hct   (38.0-50.0)  %


 


MCV   (80.0-98.0)  fL


 


MCH   (27.0-32.0)  pg


 


MCHC   (31.0-37.0)  g/dL


 


RDW Std Deviation   (28.0-62.0)  fl


 


RDW Coeff of Kevin   (11.0-15.0)  %


 


Plt Count   (150-400)  K/uL


 


MPV   (7.40-12.00)  fL


 


Neut % (Auto)   (48.0-80.0)  %


 


Lymph % (Auto)   (16.0-40.0)  %


 


Mono % (Auto)   (0.0-15.0)  %


 


Eos % (Auto)   (0.0-7.0)  %


 


Baso % (Auto)   (0.0-1.5)  %


 


Neut # (Auto)   (1.4-5.7)  K/uL


 


Lymph # (Auto)   (0.6-2.4)  K/uL


 


Mono # (Auto)   (0.0-0.8)  K/uL


 


Eos # (Auto)   (0.0-0.7)  K/uL


 


Baso # (Auto)   (0.0-0.1)  K/uL


 


Nucleated RBC %   /100WBC


 


Nucleated RBCs #   K/uL


 


D-Dimer, Quantitative   (0.0-0.50)  mg/L FEU


 


Sodium   (136-148)  mmol/L


 


Potassium   (3.5-5.1)  mmol/L


 


Chloride   ()  mmol/L


 


Carbon Dioxide   (21.0-32.0)  mmol/L


 


BUN   (7.0-18.0)  mg/dL


 


Creatinine   (0.8-1.3)  mg/dL


 


Est Cr Clr Drug Dosing   mL/min


 


Estimated GFR (MDRD)   ml/min


 


Glucose   ()  mg/dL


 


Calcium   (8.5-10.1)  mg/dL


 


Total Bilirubin   (0.2-1.0)  mg/dL


 


AST   (15-37)  IU/L


 


ALT   (14-63)  IU/L


 


Alkaline Phosphatase   ()  U/L


 


Troponin I  < 0.050  (0.000-0.056)  ng/mL


 


Total Protein   (6.4-8.2)  g/dL


 


Albumin   (3.4-5.0)  g/dL


 


Globulin   (2.6-4.0)  g/dL


 


Albumin/Globulin Ratio   (0.9-1.6)  











Meds: 





Medications











Generic Name Dose Route Start Last Admin





  Trade Name Freq  PRN Reason Stop Dose Admin


 


Sodium Chloride  10 ml  10/20/21 14:04  10/20/21 14:40





  Sodium Chloride 0.9% 10 Ml Syringe  FLUSH   10 ml





  ASDIRECTED PRN   Administration





  Keep Vein Open  


 


Sodium Chloride  2.5 ml  10/20/21 14:04  10/20/21 14:40





  Sodium Chloride 0.9% 2.5 Ml Syringe  FLUSH   2.5 ml





  ASDIRECTED PRN   Administration





  Keep Vein Open  














Discontinued Medications














Generic Name Dose Route Start Last Admin





  Trade Name Freq  PRN Reason Stop Dose Admin


 


Iopamidol  100 ml  10/20/21 17:55  10/20/21 17:55





  Iopamidol 755 Mg/Ml 100 Ml Bottle  IVPUSH  10/20/21 17:56  100 ml





  ONETIME ONE   Administration














- Re-Assessments/Exams


Free Text/Narrative Re-Assessment/Exam: 





10/20/21 18:21


Repeat troponin is negative.  Will discharge with cardiology follow-up.





Departure





- Departure


Time of Disposition: 18:21





Sepsis Event Note (ED)





- Focused Exam


Vital Signs: 





                                   Vital Signs











  Temp Pulse Resp BP Pulse Ox


 


 10/20/21 15:37   83  20  135/87  94 L


 


 10/20/21 14:10  98.4 F  83  20  137/82  95

## 2025-03-17 ENCOUNTER — HOSPITAL ENCOUNTER (EMERGENCY)
Dept: HOSPITAL 56 - MW.ED | Age: 70
Discharge: HOME | End: 2025-03-17
Payer: COMMERCIAL

## 2025-03-17 VITALS — HEART RATE: 78 BPM | SYSTOLIC BLOOD PRESSURE: 125 MMHG | DIASTOLIC BLOOD PRESSURE: 69 MMHG

## 2025-03-17 DIAGNOSIS — I11.0: Primary | ICD-10-CM

## 2025-03-17 DIAGNOSIS — Z79.899: ICD-10-CM

## 2025-03-17 DIAGNOSIS — I50.9: ICD-10-CM

## 2025-03-17 DIAGNOSIS — E03.9: ICD-10-CM

## 2025-03-17 DIAGNOSIS — Z79.01: ICD-10-CM

## 2025-03-17 DIAGNOSIS — J45.909: ICD-10-CM

## 2025-03-17 DIAGNOSIS — R79.89: ICD-10-CM

## 2025-03-17 DIAGNOSIS — Z79.890: ICD-10-CM

## 2025-03-17 DIAGNOSIS — E66.9: ICD-10-CM

## 2025-03-17 DIAGNOSIS — Z79.51: ICD-10-CM

## 2025-03-17 LAB
ALBUMIN SERPL-MCNC: 3.5 G/DL (ref 3.4–5)
ALBUMIN/GLOB SERPL: 0.9 {RATIO} (ref 0.9–1.6)
ALP SERPL-CCNC: 102 U/L (ref 46–116)
ALT SERPL-CCNC: 22 IU/L (ref 14–63)
AST SERPL-CCNC: 26 IU/L (ref 15–37)
BASOPHILS # BLD AUTO: 0.06 K/UL (ref 0–0.2)
BASOPHILS NFR BLD AUTO: 0.9 % (ref 0–1)
BILIRUB SERPL-MCNC: 1.3 MG/DL (ref 0.2–1)
BNP SERPL-MCNC: 2066 PG/ML (ref 0–125)
BUN SERPL-MCNC: 32 MG/DL (ref 7–18)
CALCIUM SERPL-MCNC: 9 MG/DL (ref 8.5–10.1)
CHLORIDE SERPL-SCNC: 99 MMOL/L (ref 98–107)
CO2 SERPL-SCNC: 25.2 MMOL/L (ref 21–32)
COMMENT: (no result)
CREAT CL 24H UR+SERPL-VRATE: (no result) ML/MIN
CREAT SERPL-MCNC: 1.7 MG/DL (ref 0.8–1.3)
EGFRCR SERPLBLD CKD-EPI 2021: 42 ML/MIN (ref 60–?)
EOSINOPHIL # BLD AUTO: 0.14 K/UL (ref 0–0.45)
EOSINOPHIL NFR BLD AUTO: 2 % (ref 0–6)
GLOBULIN SER-MCNC: 4.1 G/DL (ref 2.6–4)
GLUCOSE SERPL-MCNC: 136 MG/DL (ref 74–106)
HCT VFR BLD AUTO: 37.7 % (ref 42–52)
HGB BLD-MCNC: 12.6 G/DL (ref 14–18)
IMM GRANULOCYTES # BLD: 0.02 K/UL (ref 0–0.05)
IMM GRANULOCYTES NFR BLD: 0.3 % (ref 0–0.4)
INR PPP: 1.2 (ref 0.86–1.11)
LIPASE SERPL-CCNC: 94 U/L (ref 16–77)
LYMPHOCYTES # BLD AUTO: 1.15 K/UL (ref 1–4.8)
LYMPHOCYTES NFR BLD AUTO: 16.4 % (ref 24–44)
MAGNESIUM SERPL-MCNC: 2.4 MG/DL (ref 1.8–2.4)
MCH RBC QN AUTO: 33.5 PG (ref 28–32)
MCHC RBC AUTO-ENTMCNC: 100.3 FL (ref 83–99)
MCHC RBC AUTO-ENTMCNC: 33.4 G/DL (ref 32–36)
MONOCYTES # BLD AUTO: 0.64 K/UL (ref 0–0.8)
MONOCYTES NFR BLD AUTO: 9.1 % (ref 0–8)
NEUTROPHILS # BLD AUTO: 5 K/UL (ref 1.8–7.7)
NEUTROPHILS NFR BLD AUTO: 71.3 % (ref 41–71)
NRBC BLD AUTO-RTO: 0 /100WBC (ref 0–0.2)
NRBC BLD AUTO-RTO: 0 K/UL (ref 0–0.02)
PLATELET # BLD AUTO: 230 K/UL (ref 150–400)
PMV BLD AUTO: 8.7 FL (ref 9.4–12.4)
POTASSIUM SERPL-SCNC: 4 MMOL/L (ref 3.5–5.1)
PROT SERPL-MCNC: 7.6 G/DL (ref 6.4–8.2)
RBC # BLD AUTO: 3.76 M/UL (ref 4.52–5.9)
SODIUM SERPL-SCNC: 136 MMOL/L (ref 136–148)
WBC # BLD AUTO: 7.01 K/UL (ref 3.9–11.3)

## 2025-03-17 PROCEDURE — 83880 ASSAY OF NATRIURETIC PEPTIDE: CPT

## 2025-03-17 PROCEDURE — 36415 COLL VENOUS BLD VENIPUNCTURE: CPT

## 2025-03-17 PROCEDURE — 71045 X-RAY EXAM CHEST 1 VIEW: CPT

## 2025-03-17 PROCEDURE — 85610 PROTHROMBIN TIME: CPT

## 2025-03-17 PROCEDURE — 96374 THER/PROPH/DIAG INJ IV PUSH: CPT

## 2025-03-17 PROCEDURE — 87428 SARSCOV & INF VIR A&B AG IA: CPT

## 2025-03-17 PROCEDURE — 85025 COMPLETE CBC W/AUTO DIFF WBC: CPT

## 2025-03-17 PROCEDURE — 93005 ELECTROCARDIOGRAM TRACING: CPT

## 2025-03-17 PROCEDURE — 83690 ASSAY OF LIPASE: CPT

## 2025-03-17 PROCEDURE — 99285 EMERGENCY DEPT VISIT HI MDM: CPT

## 2025-03-17 PROCEDURE — 84484 ASSAY OF TROPONIN QUANT: CPT

## 2025-03-17 PROCEDURE — 83735 ASSAY OF MAGNESIUM: CPT

## 2025-03-17 PROCEDURE — 80053 COMPREHEN METABOLIC PANEL: CPT
